# Patient Record
Sex: FEMALE | Race: WHITE | URBAN - METROPOLITAN AREA
[De-identification: names, ages, dates, MRNs, and addresses within clinical notes are randomized per-mention and may not be internally consistent; named-entity substitution may affect disease eponyms.]

---

## 2021-01-25 ENCOUNTER — APPOINTMENT (OUTPATIENT)
Dept: GENERAL RADIOLOGY | Age: 21
End: 2021-01-25
Payer: COMMERCIAL

## 2021-01-25 ENCOUNTER — HOSPITAL ENCOUNTER (EMERGENCY)
Age: 21
Discharge: HOME | End: 2021-01-25
Payer: COMMERCIAL

## 2021-01-25 ENCOUNTER — APPOINTMENT (OUTPATIENT)
Dept: CT IMAGING | Age: 21
End: 2021-01-25
Payer: COMMERCIAL

## 2021-01-25 VITALS
SYSTOLIC BLOOD PRESSURE: 113 MMHG | TEMPERATURE: 99.5 F | BODY MASS INDEX: 20.82 KG/M2 | RESPIRATION RATE: 19 BRPM | DIASTOLIC BLOOD PRESSURE: 71 MMHG | WEIGHT: 117.5 LBS | HEIGHT: 63 IN | HEART RATE: 99 BPM | OXYGEN SATURATION: 100 %

## 2021-01-25 DIAGNOSIS — L03.211 CELLULITIS OF FACE: ICD-10-CM

## 2021-01-25 DIAGNOSIS — J03.90 ACUTE TONSILLITIS, UNSPECIFIED ETIOLOGY: ICD-10-CM

## 2021-01-25 DIAGNOSIS — K11.21 ACUTE PAROTITIS: Primary | ICD-10-CM

## 2021-01-25 LAB
ANION GAP SERPL CALCULATED.3IONS-SCNC: 11 MMOL/L (ref 3–16)
BASOPHILS ABSOLUTE: 0 K/UL (ref 0–0.2)
BASOPHILS RELATIVE PERCENT: 0.4 %
BUN BLDV-MCNC: 11 MG/DL (ref 7–20)
CALCIUM SERPL-MCNC: 9.1 MG/DL (ref 8.3–10.6)
CHLORIDE BLD-SCNC: 97 MMOL/L (ref 99–110)
CO2: 24 MMOL/L (ref 21–32)
CREAT SERPL-MCNC: 0.6 MG/DL (ref 0.6–1.1)
EOSINOPHILS ABSOLUTE: 0 K/UL (ref 0–0.6)
EOSINOPHILS RELATIVE PERCENT: 0.1 %
GFR AFRICAN AMERICAN: >60
GFR NON-AFRICAN AMERICAN: >60
GLUCOSE BLD-MCNC: 103 MG/DL (ref 70–99)
HCT VFR BLD CALC: 40.3 % (ref 36–48)
HEMOGLOBIN: 13.6 G/DL (ref 12–16)
LACTIC ACID, SEPSIS: 1.1 MMOL/L (ref 0.4–1.9)
LACTIC ACID: 0.7 MMOL/L (ref 0.4–2)
LYMPHOCYTES ABSOLUTE: 1.2 K/UL (ref 1–5.1)
LYMPHOCYTES RELATIVE PERCENT: 14.5 %
MCH RBC QN AUTO: 31 PG (ref 26–34)
MCHC RBC AUTO-ENTMCNC: 33.9 G/DL (ref 31–36)
MCV RBC AUTO: 91.6 FL (ref 80–100)
MONOCYTES ABSOLUTE: 0.4 K/UL (ref 0–1.3)
MONOCYTES RELATIVE PERCENT: 4.9 %
NEUTROPHILS ABSOLUTE: 6.6 K/UL (ref 1.7–7.7)
NEUTROPHILS RELATIVE PERCENT: 80.1 %
PDW BLD-RTO: 13 % (ref 12.4–15.4)
PLATELET # BLD: 184 K/UL (ref 135–450)
PMV BLD AUTO: 8.4 FL (ref 5–10.5)
POTASSIUM REFLEX MAGNESIUM: 4 MMOL/L (ref 3.5–5.1)
RBC # BLD: 4.4 M/UL (ref 4–5.2)
S PYO AG THROAT QL: NEGATIVE
SODIUM BLD-SCNC: 132 MMOL/L (ref 136–145)
WBC # BLD: 8.3 K/UL (ref 4–11)

## 2021-01-25 PROCEDURE — 6360000002 HC RX W HCPCS: Performed by: NURSE PRACTITIONER

## 2021-01-25 PROCEDURE — 99285 EMERGENCY DEPT VISIT HI MDM: CPT

## 2021-01-25 PROCEDURE — 87081 CULTURE SCREEN ONLY: CPT

## 2021-01-25 PROCEDURE — 6370000000 HC RX 637 (ALT 250 FOR IP): Performed by: NURSE PRACTITIONER

## 2021-01-25 PROCEDURE — 96365 THER/PROPH/DIAG IV INF INIT: CPT

## 2021-01-25 PROCEDURE — 87040 BLOOD CULTURE FOR BACTERIA: CPT

## 2021-01-25 PROCEDURE — 71046 X-RAY EXAM CHEST 2 VIEWS: CPT

## 2021-01-25 PROCEDURE — 6360000004 HC RX CONTRAST MEDICATION: Performed by: NURSE PRACTITIONER

## 2021-01-25 PROCEDURE — 70491 CT SOFT TISSUE NECK W/DYE: CPT

## 2021-01-25 PROCEDURE — 36415 COLL VENOUS BLD VENIPUNCTURE: CPT

## 2021-01-25 PROCEDURE — 83605 ASSAY OF LACTIC ACID: CPT

## 2021-01-25 PROCEDURE — 96366 THER/PROPH/DIAG IV INF ADDON: CPT

## 2021-01-25 PROCEDURE — 87880 STREP A ASSAY W/OPTIC: CPT

## 2021-01-25 PROCEDURE — 80048 BASIC METABOLIC PNL TOTAL CA: CPT

## 2021-01-25 PROCEDURE — 85025 COMPLETE CBC W/AUTO DIFF WBC: CPT

## 2021-01-25 PROCEDURE — 2580000003 HC RX 258: Performed by: NURSE PRACTITIONER

## 2021-01-25 PROCEDURE — 87077 CULTURE AEROBIC IDENTIFY: CPT

## 2021-01-25 PROCEDURE — 96367 TX/PROPH/DG ADDL SEQ IV INF: CPT

## 2021-01-25 RX ORDER — IBUPROFEN 400 MG/1
800 TABLET ORAL ONCE
Status: COMPLETED | OUTPATIENT
Start: 2021-01-25 | End: 2021-01-25

## 2021-01-25 RX ORDER — 0.9 % SODIUM CHLORIDE 0.9 %
30 INTRAVENOUS SOLUTION INTRAVENOUS ONCE
Status: COMPLETED | OUTPATIENT
Start: 2021-01-25 | End: 2021-01-25

## 2021-01-25 RX ORDER — ACETAMINOPHEN 500 MG
1000 TABLET ORAL ONCE
Status: DISCONTINUED | OUTPATIENT
Start: 2021-01-25 | End: 2021-01-26 | Stop reason: HOSPADM

## 2021-01-25 RX ORDER — 0.9 % SODIUM CHLORIDE 0.9 %
1000 INTRAVENOUS SOLUTION INTRAVENOUS ONCE
Status: COMPLETED | OUTPATIENT
Start: 2021-01-25 | End: 2021-01-25

## 2021-01-25 RX ORDER — CLINDAMYCIN HYDROCHLORIDE 150 MG/1
450 CAPSULE ORAL 3 TIMES DAILY
Qty: 126 CAPSULE | Refills: 0 | Status: SHIPPED | OUTPATIENT
Start: 2021-01-25 | End: 2021-02-08

## 2021-01-25 RX ORDER — ACETAMINOPHEN 500 MG
1000 TABLET ORAL 4 TIMES DAILY PRN
Qty: 360 TABLET | Refills: 0 | Status: SHIPPED | OUTPATIENT
Start: 2021-01-25

## 2021-01-25 RX ORDER — IBUPROFEN 800 MG/1
800 TABLET ORAL EVERY 8 HOURS PRN
Qty: 20 TABLET | Refills: 0 | Status: SHIPPED | OUTPATIENT
Start: 2021-01-25

## 2021-01-25 RX ADMIN — CEFEPIME HYDROCHLORIDE 2000 MG: 2 INJECTION, POWDER, FOR SOLUTION INTRAVENOUS at 18:27

## 2021-01-25 RX ADMIN — IOPAMIDOL 75 ML: 755 INJECTION, SOLUTION INTRAVENOUS at 18:40

## 2021-01-25 RX ADMIN — SODIUM CHLORIDE 1000 ML: 9 INJECTION, SOLUTION INTRAVENOUS at 21:10

## 2021-01-25 RX ADMIN — VANCOMYCIN HYDROCHLORIDE 1000 MG: 1 INJECTION, POWDER, LYOPHILIZED, FOR SOLUTION INTRAVENOUS at 19:41

## 2021-01-25 RX ADMIN — SODIUM CHLORIDE 1599 ML: 9 INJECTION, SOLUTION INTRAVENOUS at 18:26

## 2021-01-25 RX ADMIN — IBUPROFEN 400 MG: 400 TABLET, FILM COATED ORAL at 17:26

## 2021-01-25 ASSESSMENT — PAIN DESCRIPTION - ORIENTATION: ORIENTATION: RIGHT

## 2021-01-25 ASSESSMENT — PAIN SCALES - GENERAL
PAINLEVEL_OUTOF10: 1
PAINLEVEL_OUTOF10: 1

## 2021-01-25 ASSESSMENT — PAIN DESCRIPTION - DESCRIPTORS: DESCRIPTORS: ACHING

## 2021-01-25 NOTE — ED TRIAGE NOTES
Pt presents to ED via PV with c/o of right sided face/ear swelling over 3 days. Redness noted to right side of face. +tachycardia and +febrile. Pt appears anxious. Resp even and unlabored. A/ox4. No acute distress noted. Denies any need at this time. Call light within reach. Bed in lowest position. Will continue to monitor.

## 2021-01-25 NOTE — ED PROVIDER NOTES
[address] [facility]  [address] [place] [address]  Dept: [phone]  Loc: [address] ENCOUNTER        This patient was not seen or evaluated by the attending physician. I evaluated this patient, the attending physician was available for consultation. CHIEF COMPLAINT    Chief Complaint   Patient presents with    Other     right side of face is swelling into ear area, increasing in swelling and pain over the last three days       HPI    Luis Zhang is a 23 y.o. female who presents with redness of the skin located right cheek. Onset was approximately 3 days ago. She states that she typically does get frequent infections from her ear piercings. She states that 3 days ago she noticed some pain and redness on the right earlobe, that is gradually worsened. She states that it is now extended into her right cheek. Denies any pharyngitis or difficulty swallowing or drooling. No phonation changes. Is not diabetic or immunocompromise. Denies any recent antibiotic use. Denies any drainage from the right ear. Denies any visual changes or eye pain. The duration has been constant since the onset. The pain severity is 1/10. There are no alleviating factors. Patient came to the ED for further evaluation and treatment. REVIEW OF SYSTEMS    Constitutional: no measured fevers at home (febrile in the ED),+ chills  Respiratory:  No cough or shortness of breath, no sensation of throat closing, difficulty breathing or swallowing  Cardiovascular:  No chest pain  GI: No vomiting or abdominal pain  Musculoskeletal: No neck pain or stiffness  Skin: see HPI  All other systems reviewed and are negative. PAST MEDICAL HISTORY/SURGICAL HISTORY     History reviewed. No pertinent past medical history.   Past Surgical History:   Procedure Laterality Date    DENTAL SURGERY         CURRENT MEDICATIONS  (may include discharge medications prescribed in the ED)      ALLERGIES    No Known Allergies    FAMILY HISTORY/SOCIAL HISTORY    History reviewed. No pertinent family history. Social History     Socioeconomic History    Marital status: Single     Spouse name: None    Number of children: None    Years of education: None    Highest education level: None   Occupational History    None   Social Needs    Financial resource strain: None    Food insecurity     Worry: None     Inability: None    Transportation needs     Medical: None     Non-medical: None   Tobacco Use    Smoking status: Never Smoker    Smokeless tobacco: Never Used   Substance and Sexual Activity    Alcohol use: Never     Frequency: Never    Drug use: Never    Sexual activity: None   Lifestyle    Physical activity     Days per week: None     Minutes per session: None    Stress: None   Relationships    Social connections     Talks on phone: None     Gets together: None     Attends [employer]oration[employer] service: None     Active member of club or organization: None     Attends meetings of clubs or organizations: None     Relationship status: None    Intimate partner violence     Fear of current or ex partner: None     Emotionally abused: None     Physically abused: None     Forced sexual activity: None   Other Topics Concern    None   Social History Narrative    None       PHYSICAL EXAM    VITAL SIGNS: /71   Pulse 99   Temp 99.5 °F (37.5 °C) (Oral)   Resp 19   Ht 5' 3\" (1.6 m)   Wt 117 lb 8.1 oz (53.3 kg)   LMP 01/22/2021   SpO2 100%   BMI 20.82 kg/m²    General:  Well developed, Well nourished, no acute distress  HENT: No trismus, moist mucus membranes.;  Right external earlobe is slightly erythematous and edematous. Ipsilateral earlobe unremarkable. Bilateral ear canals are not erythematous or edematous. TMs bilaterally are gray and pearlescent and not bulging. No briscoe signs or raccoon eyes bilaterally.   No pain upon palpation of the mastoid process 0.4 - 2.0 mmol/L   Lactate, Sepsis   Result Value Ref Range    Lactic Acid, Sepsis 1.1 0.4 - 1.9 mmol/L         RADIOLOGY  CT SOFT TISSUE NECK W CONTRAST   Final Result   1. Findings of acute uncomplicated right parotiditis with overlying   cellulitis. 2. Findings suggest acute left tonsillitis. No evidence of abscess. XR CHEST (2 VW)   Final Result   No acute cardiopulmonary disease. ED COURSE/MEDICAL DECISION MAKING   See EMR for medications prescribed in the ED. Vitals:    01/25/21 2108 01/25/21 2132 01/25/21 2134 01/25/21 2141   BP:  113/71     Pulse: 102 97 98 99   Resp: 21 20 20 19   Temp:       TempSrc:       SpO2: 100% 100% 100% 100%   Weight:       Height:           Differential diagnosis: Necrotizing fasciitis, cellulitis, erysipelas, suppurative thrombophlebitis, aseptic superficial thrombophlebitis, DVT, gout, mastoiditis, meningitis, erythema migrans (lyme disease), contact dermatitis, lymphedema, other    CRITICAL CARE NOTE:  There was a high probability of clinically significant life-threatening deterioration of the patient's condition requiring my urgent intervention. Total critical care time was at least 45 minutes. This includes vital sign monitoring, pulse oximetry monitoring, telemetry monitoring, clinical response to the IV medications, reviewing the nursing notes, consultation time, dictation/documentation time, and interpretation of the labwork. This excludes any separately billable procedures performed. Patient is febrile, tachycardic into the 120s. She is normotensive and not hypoxic. Appears to be in no acute respiratory distress. No respiratory compromise on my physical exam, no airway compromise. Labs reveal no leukocytosis or anemia. She has no lactic acidosis. Metabolic panel unremarkable. Rapid strep negative. Blood cultures were sent and are in process. CT findings as above.      Reevaluation at 2200: Patient is resting

## 2021-01-26 NOTE — CONSULTS
Clinical Pharmacy Note  Vancomycin Consult    Pharmacy consult received for one-time dose of vancomycin in the Emergency Department per MARICARMEN Sharpe. Ht Readings from Last 1 Encounters:   01/25/21 5' 3\" (1.6 m)        Wt Readings from Last 1 Encounters:   01/25/21 117 lb 8.1 oz (53.3 kg)         Assessment/Plan:   Vancomycin 1000mg x 1 in ED.  If Vancomycin is to continue on admission and pharmacy is to manage dosing, please re-consult with admission orders.

## 2021-01-27 LAB
ORGANISM: ABNORMAL
S PYO THROAT QL CULT: ABNORMAL
S PYO THROAT QL CULT: ABNORMAL

## 2021-01-29 LAB
BLOOD CULTURE, ROUTINE: NORMAL
CULTURE, BLOOD 2: NORMAL